# Patient Record
Sex: FEMALE | HISPANIC OR LATINO
[De-identification: names, ages, dates, MRNs, and addresses within clinical notes are randomized per-mention and may not be internally consistent; named-entity substitution may affect disease eponyms.]

---

## 2021-09-29 PROBLEM — Z00.00 ENCOUNTER FOR PREVENTIVE HEALTH EXAMINATION: Status: ACTIVE | Noted: 2021-09-29

## 2021-09-30 ENCOUNTER — APPOINTMENT (OUTPATIENT)
Dept: OBGYN | Facility: CLINIC | Age: 31
End: 2021-09-30
Payer: COMMERCIAL

## 2021-09-30 ENCOUNTER — NON-APPOINTMENT (OUTPATIENT)
Age: 31
End: 2021-09-30

## 2021-09-30 VITALS
SYSTOLIC BLOOD PRESSURE: 123 MMHG | WEIGHT: 138 LBS | DIASTOLIC BLOOD PRESSURE: 70 MMHG | BODY MASS INDEX: 24.45 KG/M2 | HEIGHT: 63 IN

## 2021-09-30 DIAGNOSIS — Z82.49 FAMILY HISTORY OF ISCHEMIC HEART DISEASE AND OTHER DISEASES OF THE CIRCULATORY SYSTEM: ICD-10-CM

## 2021-09-30 DIAGNOSIS — Z01.419 ENCOUNTER FOR GYNECOLOGICAL EXAMINATION (GENERAL) (ROUTINE) W/OUT ABNORMAL FINDINGS: ICD-10-CM

## 2021-09-30 DIAGNOSIS — Z83.3 FAMILY HISTORY OF DIABETES MELLITUS: ICD-10-CM

## 2021-09-30 DIAGNOSIS — R87.629 UNSPECIFIED ABNORMAL CYTOLOGICAL FINDINGS IN SPECIMENS FROM VAGINA: ICD-10-CM

## 2021-09-30 DIAGNOSIS — Z80.41 FAMILY HISTORY OF MALIGNANT NEOPLASM OF OVARY: ICD-10-CM

## 2021-09-30 DIAGNOSIS — Z78.9 OTHER SPECIFIED HEALTH STATUS: ICD-10-CM

## 2021-09-30 DIAGNOSIS — Z98.890 OTHER SPECIFIED POSTPROCEDURAL STATES: ICD-10-CM

## 2021-09-30 DIAGNOSIS — Z13.220 ENCOUNTER FOR SCREENING FOR LIPOID DISORDERS: ICD-10-CM

## 2021-09-30 DIAGNOSIS — Z82.3 FAMILY HISTORY OF STROKE: ICD-10-CM

## 2021-09-30 PROCEDURE — 99204 OFFICE O/P NEW MOD 45 MIN: CPT

## 2021-09-30 PROCEDURE — 36415 COLL VENOUS BLD VENIPUNCTURE: CPT

## 2021-09-30 PROCEDURE — 81003 URINALYSIS AUTO W/O SCOPE: CPT | Mod: QW

## 2021-10-01 PROBLEM — Z01.419 WELL WOMAN EXAM WITH ROUTINE GYNECOLOGICAL EXAM: Status: ACTIVE | Noted: 2021-09-30

## 2021-10-04 ENCOUNTER — NON-APPOINTMENT (OUTPATIENT)
Age: 31
End: 2021-10-04

## 2021-10-04 DIAGNOSIS — N39.0 URINARY TRACT INFECTION, SITE NOT SPECIFIED: ICD-10-CM

## 2021-10-04 DIAGNOSIS — N39.41 URGE INCONTINENCE: ICD-10-CM

## 2021-10-04 PROBLEM — Z98.890 HISTORY OF COLPOSCOPY: Status: RESOLVED | Noted: 2021-10-04 | Resolved: 2021-10-04

## 2021-10-04 LAB
25(OH)D3 SERPL-MCNC: 17.2 NG/ML
BACTERIA UR CULT: ABNORMAL
BASOPHILS # BLD AUTO: 0.03 K/UL
BASOPHILS NFR BLD AUTO: 0.5 %
CHOLEST SERPL-MCNC: 164 MG/DL
CHOLEST SERPL-MCNC: 168 MG/DL
EOSINOPHIL # BLD AUTO: 0.05 K/UL
EOSINOPHIL NFR BLD AUTO: 0.9 %
HCT VFR BLD CALC: 42.8 %
HDLC SERPL-MCNC: 80 MG/DL
HGB BLD-MCNC: 13.4 G/DL
HPV HIGH+LOW RISK DNA PNL CVX: NOT DETECTED
IMM GRANULOCYTES NFR BLD AUTO: 0.7 %
LDLC SERPL CALC-MCNC: 74 MG/DL
LYMPHOCYTES # BLD AUTO: 2.03 K/UL
LYMPHOCYTES NFR BLD AUTO: 35.5 %
MAN DIFF?: NORMAL
MCHC RBC-ENTMCNC: 28.5 PG
MCHC RBC-ENTMCNC: 31.3 GM/DL
MCV RBC AUTO: 91.1 FL
MONOCYTES # BLD AUTO: 0.56 K/UL
MONOCYTES NFR BLD AUTO: 9.8 %
NEUTROPHILS # BLD AUTO: 3.01 K/UL
NEUTROPHILS NFR BLD AUTO: 52.6 %
NONHDLC SERPL-MCNC: 84 MG/DL
PLATELET # BLD AUTO: 305 K/UL
RBC # BLD: 4.7 M/UL
RBC # FLD: 13.4 %
TRIGL SERPL-MCNC: 51 MG/DL
TSH SERPL-ACNC: 2.63 UIU/ML
WBC # FLD AUTO: 5.72 K/UL

## 2021-10-04 NOTE — REVIEW OF SYSTEMS
[Genital Rash/Irritation] : genital rash/irritation [Negative] : Integumentary [FreeTextEntry6] : non labored breathing

## 2021-10-04 NOTE — HISTORY OF PRESENT ILLNESS
[Patient reported PAP Smear was normal] : Patient reported PAP Smear was normal [N] : Patient denies prior pregnancies [Previously active] : previously active [Regular Cycle Intervals] : periods have been regular [No] : No [Patient refuses STI testing] : Patient refuses STI testing [PapSmeardate] : 2020 [LMPDate] : 09/13/2021 [MensesFreq] : 28 [MensesLength] : 5 [PGHxTotal] : 0 [PGHxFullTerm] : 0 [PGHxPremature] : 0 [PGHxAbortions] : 0 [[facility]] : 0 [PGHxABInduced] : 0 [PGHxABSpont] : 0 [PGHxEctopic] : 0 [PGHxMultBirths] : 0 [FreeTextEntry1] : not currently active

## 2021-10-04 NOTE — PLAN
[FreeTextEntry1] : no breast exam performed.\par pelvic exam: external genitalia and labia with noticeable erythema and irritation. Patient stated she has been "scratching". Discussed removing irritants: new detergent, pads, clean/dry clothing post workout/swimming. Reinforced wearing 100% cotton underwear. \par \par She has not had blood work done in a few years: lipid profile, cbc, vit d, TSH\par repeat pap/hpv, vaginitis panel, urine culture sent. \par Clotrimazole-Betamethasone cream called into pharmacy for labial/perineum irritation. \par \par will call back with results. f/u in April for annual

## 2021-10-04 NOTE — COUNSELING
[Bladder Hygiene] : bladder hygiene [Contraception/ Emergency Contraception/ Safe Sexual Practices] : contraception, emergency contraception, safe sexual practices [Other ___] : [unfilled]

## 2021-10-04 NOTE — PHYSICAL EXAM
[Appropriately responsive] : appropriately responsive [Alert] : alert [No Acute Distress] : no acute distress [Clear to Auscultation B/L] : clear to auscultation bilaterally [Soft] : soft [Non-tender] : non-tender [Non-distended] : non-distended [Oriented x3] : oriented x3 [Labia Majora Erythema] : erythema [Normal] : normal [Uterine Adnexae] : normal [FreeTextEntry1] : erythema on perineum  [FreeTextEntry2] : e

## 2021-10-06 LAB
A VAGINAE DNA VAG QL NAA+PROBE: NORMAL
BVAB2 DNA VAG QL NAA+PROBE: NORMAL
C KRUSEI DNA VAG QL NAA+PROBE: NEGATIVE
C KRUSEI DNA VAG QL NAA+PROBE: POSITIVE
C TRACH RRNA SPEC QL NAA+PROBE: NEGATIVE
MEGA1 DNA VAG QL NAA+PROBE: NORMAL
N GONORRHOEA RRNA SPEC QL NAA+PROBE: NEGATIVE
T VAGINALIS RRNA SPEC QL NAA+PROBE: NEGATIVE

## 2021-10-07 LAB — CYTOLOGY CVX/VAG DOC THIN PREP: ABNORMAL

## 2021-11-04 ENCOUNTER — APPOINTMENT (OUTPATIENT)
Dept: HEART AND VASCULAR | Facility: CLINIC | Age: 31
End: 2021-11-04
Payer: COMMERCIAL

## 2021-11-04 ENCOUNTER — TRANSCRIPTION ENCOUNTER (OUTPATIENT)
Age: 31
End: 2021-11-04

## 2021-11-04 VITALS
WEIGHT: 139 LBS | HEIGHT: 63 IN | HEART RATE: 81 BPM | DIASTOLIC BLOOD PRESSURE: 60 MMHG | SYSTOLIC BLOOD PRESSURE: 110 MMHG | BODY MASS INDEX: 24.63 KG/M2 | TEMPERATURE: 98 F | OXYGEN SATURATION: 98 % | RESPIRATION RATE: 14 BRPM

## 2021-11-04 DIAGNOSIS — J32.9 CHRONIC SINUSITIS, UNSPECIFIED: ICD-10-CM

## 2021-11-04 PROCEDURE — 99202 OFFICE O/P NEW SF 15 MIN: CPT

## 2021-11-04 RX ORDER — AMOXICILLIN 500 MG/1
500 CAPSULE ORAL 3 TIMES DAILY
Qty: 3 | Refills: 0 | Status: DISCONTINUED | COMMUNITY
Start: 2021-10-04 | End: 2021-11-04

## 2021-11-04 RX ORDER — FLUTICASONE PROPIONATE 50 UG/1
50 SPRAY, METERED NASAL DAILY
Qty: 1 | Refills: 3 | Status: ACTIVE | COMMUNITY
Start: 2021-11-04 | End: 1900-01-01

## 2021-11-04 RX ORDER — AMOXICILLIN 500 MG/1
500 TABLET, FILM COATED ORAL 3 TIMES DAILY
Qty: 9 | Refills: 0 | Status: DISCONTINUED | COMMUNITY
Start: 2021-10-05 | End: 2021-11-04

## 2021-11-04 NOTE — PHYSICAL EXAM
[Well Nourished] : well nourished [No Acute Distress] : no acute distress [Normal Conjunctiva] : normal conjunctiva [Clear Lung Fields] : clear lung fields [Normal Gait] : normal gait [No Edema] : no edema [No Cyanosis] : no cyanosis [No Clubbing] : no clubbing [Moves all extremities] : moves all extremities [No Focal Deficits] : no focal deficits [Normal Speech] : normal speech [Alert and Oriented] : alert and oriented [Normal memory] : normal memory

## 2021-11-04 NOTE — HISTORY OF PRESENT ILLNESS
[FreeTextEntry1] : recently completed course of antibx for sinusitis, seen in [facility]\par still c/o post nasal drip and sinus discomfort\par clear mucous no fever

## 2021-11-05 ENCOUNTER — APPOINTMENT (OUTPATIENT)
Dept: OTOLARYNGOLOGY | Facility: CLINIC | Age: 31
End: 2021-11-05
Payer: COMMERCIAL

## 2021-11-05 VITALS
HEART RATE: 60 BPM | DIASTOLIC BLOOD PRESSURE: 71 MMHG | HEIGHT: 63 IN | WEIGHT: 135 LBS | OXYGEN SATURATION: 99 % | BODY MASS INDEX: 23.92 KG/M2 | TEMPERATURE: 98.2 F | SYSTOLIC BLOOD PRESSURE: 105 MMHG

## 2021-11-05 DIAGNOSIS — J34.89 OTHER SPECIFIED DISORDERS OF NOSE AND NASAL SINUSES: ICD-10-CM

## 2021-11-05 DIAGNOSIS — J32.9 CHRONIC SINUSITIS, UNSPECIFIED: ICD-10-CM

## 2021-11-05 PROCEDURE — 31231 NASAL ENDOSCOPY DX: CPT

## 2021-11-05 PROCEDURE — 99204 OFFICE O/P NEW MOD 45 MIN: CPT | Mod: 25

## 2021-11-05 RX ORDER — METHYLPREDNISOLONE 4 MG/1
4 TABLET ORAL
Qty: 1 | Refills: 0 | Status: ACTIVE | COMMUNITY
Start: 2021-11-05 | End: 1900-01-01

## 2021-11-05 RX ORDER — DOXYCYCLINE CALCIUM 50 MG/5ML
50 SYRUP ORAL TWICE DAILY
Qty: 200 | Refills: 0 | Status: ACTIVE | COMMUNITY
Start: 2021-11-05 | End: 1900-01-01

## 2021-11-05 NOTE — HISTORY OF PRESENT ILLNESS
[de-identified] : 30 yo female who presents with nasal congestion.  2 weeks ago symptoms started including facial pain and pressure, tenderness, rhinorrhea.  She also had fatigue.  Some decrease in smell and taste.  The symptoms lasted over 1 weeks. she also had some congestion in the ears.  No treatment tried.  Went to the urgent care.  COVID negative  She was dx with sinusitis an treated with amoxicillin and nasal steroid.  She also has been using the netti pot.  She started to have improvement and then over the past 2 days, symptoms have returned.\par \par Of note, hx of nasal surgery including septoplasty and turbinectomy in February 2020 in [place].

## 2021-11-05 NOTE — PROCEDURE
[FreeTextEntry6] : Nasal Endoscopy\par Procedure Note\par   \par Pre-operative Diagnosis: nasal congestion\par Post-operative Diagnosis: same, acute sinusitis\par Anesthesia: Topical\par Procedure: Bilateral nasal endoscopy\par   \par Procedure Details: \par After topical anesthesia and decongestant, the patient was placed in the supine position. The telescope was passed along the left nasal floor to the nasopharynx. It was then passed into the region of the middle meatus, middle turbinate, and the sphenoethmoid region.  An identical procedure was performed on the right side. \par   \par Findings: \par Mucosa: 	                mild edema	\par Nasal septum: 	midline - post surgical\par Discharge: 	none	\par Turbinates: 	mild hypertrophy, but evidence of previous surgery	\par Adenoid: 	                normal	\par Posterior choanae: 	normal	\par Eustachian tubes: 	normal	\par Mucous stranding: 	normal 	\par Lesions: 	                Not present	\par   \par Comments: \par Condition: Stable. Patient tolerated procedure well.\par Complications: None\par \par

## 2021-11-05 NOTE — ASSESSMENT
[FreeTextEntry1] : 30 yo female who presents with nasal congestion, rhinorrhea, and facial pain.  She had been started on amoxicillin and nasal steroids to no avail.  Exam without overt purulence, but given symptoms I am recommending a change to the antibiotics.  Will trial doxycycline as well as a medrol dos lucian in addition to nasal saline rinse and nasal steroids.  Follow up in 1 week.  If symptoms don't improve will plan CT sinus.\par \par Discussed appropriate way to take doxy, including risk fo sun sensitivity, and esophagitis.  Discussed switching abx is ok, and she was given anti-fungal from PCP if concern for opportunistic growth w abx use.  I advised followup with GYN if that is the case.\par \par - doxycycline\par - medrol\par - nasal saline, nasal steroids\par - fu 1 week for reevaluation.

## 2021-11-09 ENCOUNTER — TRANSCRIPTION ENCOUNTER (OUTPATIENT)
Age: 31
End: 2021-11-09

## 2021-11-11 ENCOUNTER — TRANSCRIPTION ENCOUNTER (OUTPATIENT)
Age: 31
End: 2021-11-11

## 2021-11-15 ENCOUNTER — TRANSCRIPTION ENCOUNTER (OUTPATIENT)
Age: 31
End: 2021-11-15

## 2021-11-23 ENCOUNTER — APPOINTMENT (OUTPATIENT)
Dept: OTOLARYNGOLOGY | Facility: CLINIC | Age: 31
End: 2021-11-23
Payer: COMMERCIAL

## 2021-11-23 DIAGNOSIS — R51.9 HEADACHE, UNSPECIFIED: ICD-10-CM

## 2021-11-23 DIAGNOSIS — R09.81 NASAL CONGESTION: ICD-10-CM

## 2021-11-23 PROCEDURE — 99212 OFFICE O/P EST SF 10 MIN: CPT | Mod: 95

## 2021-11-30 PROBLEM — R09.81 NASAL CONGESTION: Status: ACTIVE | Noted: 2021-11-05

## 2021-11-30 PROBLEM — R51.9 FACIAL PAIN: Status: ACTIVE | Noted: 2021-11-05

## 2021-11-30 NOTE — HISTORY OF PRESENT ILLNESS
[Home] : at home, [unfilled] , at the time of the visit. [Medical Office: ([facility])___] : at the medical office located in  [Verbal consent obtained from patient] : the patient, [unfilled] [de-identified] : 32 yo female who presents with nasal congestion.  2 weeks ago symptoms started including facial pain and pressure, tenderness, rhinorrhea.  She also had fatigue.  Some decrease in smell and taste.  The symptoms lasted over 1 weeks. she also had some congestion in the ears.  No treatment tried.  Went to the urgent care.  COVID negative  She was dx with sinusitis an treated with amoxicillin and nasal steroid.  She also has been using the netti pot.  She started to have improvement and then over the past 2 days, symptoms have returned.\par \par Of note, hx of nasal surgery including septoplasty and turbinectomy in February 2020 in [place].\par - [FreeTextEntry1] : Update 11/23/21\par Overall stable and well.  She was able to take the doxycycline without issue.  She has not been as regimented about nasal saline use.  Overall she does note improvement in symptoms and facial pain is resolved.  She does have some mild congestion.  No ENT issues otherwise.

## 2021-11-30 NOTE — ASSESSMENT
[FreeTextEntry1] : 30 yo female who presents with nasal congestion, rhinorrhea, and facial pain.   Symptoms now resolved after course of abx.  She does have some remaining nasal congestion, but hasn't been as regimented with nasal saline.  At this point I am recommending nasal saline and follow up in 3-4 weeks.  If symptoms still persist, will need re-eval in the office and possibly CT sinus.\par \par - nasal saline, nasal steroids\par - fu 3-4 weeks  \par - if symptoms persist, consider CT sinus

## 2021-12-03 ENCOUNTER — APPOINTMENT (OUTPATIENT)
Dept: OTOLARYNGOLOGY | Facility: CLINIC | Age: 31
End: 2021-12-03

## 2022-01-20 ENCOUNTER — APPOINTMENT (OUTPATIENT)
Dept: OBGYN | Facility: CLINIC | Age: 32
End: 2022-01-20

## 2022-01-20 ENCOUNTER — APPOINTMENT (OUTPATIENT)
Dept: OBGYN | Facility: CLINIC | Age: 32
End: 2022-01-20
Payer: COMMERCIAL

## 2022-01-20 VITALS
DIASTOLIC BLOOD PRESSURE: 75 MMHG | BODY MASS INDEX: 26.22 KG/M2 | HEIGHT: 63 IN | SYSTOLIC BLOOD PRESSURE: 109 MMHG | HEART RATE: 97 BPM | WEIGHT: 148 LBS

## 2022-01-20 DIAGNOSIS — F41.9 ANXIETY DISORDER, UNSPECIFIED: ICD-10-CM

## 2022-01-20 DIAGNOSIS — B37.3 CANDIDIASIS OF VULVA AND VAGINA: ICD-10-CM

## 2022-01-20 PROCEDURE — 99214 OFFICE O/P EST MOD 30 MIN: CPT

## 2022-01-20 RX ORDER — DOXYCYCLINE HYCLATE 100 MG/1
100 CAPSULE ORAL TWICE DAILY
Qty: 20 | Refills: 0 | Status: COMPLETED | COMMUNITY
Start: 2021-11-10 | End: 2022-01-20

## 2022-01-20 RX ORDER — CLOTRIMAZOLE AND BETAMETHASONE DIPROPIONATE 10; .5 MG/G; MG/G
1-0.05 CREAM TOPICAL
Qty: 1 | Refills: 1 | Status: COMPLETED | COMMUNITY
Start: 2021-09-30 | End: 2022-01-20

## 2022-01-20 RX ORDER — DOXYCYCLINE HYCLATE 100 MG/1
100 TABLET ORAL
Qty: 20 | Refills: 0 | Status: COMPLETED | COMMUNITY
Start: 2021-11-11 | End: 2022-01-20

## 2022-01-20 RX ORDER — CITALOPRAM 10 MG/1
TABLET, FILM COATED ORAL
Refills: 0 | Status: COMPLETED | COMMUNITY
End: 2022-01-20

## 2022-01-24 NOTE — PHYSICAL EXAM
[Chaperone Present] : A chaperone was present in the examining room during all aspects of the physical examination [Appropriately responsive] : appropriately responsive [Alert] : alert [No Acute Distress] : no acute distress [Soft] : soft [Non-tender] : non-tender [Oriented x3] : oriented x3 [Labia Majora] : normal [Labia Minora] : normal [Discharge] : a  ~M vaginal discharge was present [Moderate] : moderate [White] : white [Thick] : thick [Normal] : normal [Anteversion] : anteverted [Uterine Adnexae] : normal

## 2022-01-24 NOTE — HISTORY OF PRESENT ILLNESS
[FreeTextEntry1] : 30 yo presents due to vaginal discharge likely candida, she gets frequent candida infections. She took antibiotics in November and December and has been with repeated candida infections ever since. She uses the "pill" twice. She has irritate, discharge and itching, no dysuria. She is also requesting a refill of her anxiety medication has not found a pcp here in [place] [facility] yet  [LMPDate] : 01/03/2022 [MensesFreq] : 28 [MensesLength] : 5

## 2022-01-24 NOTE — DISCUSSION/SUMMARY
[FreeTextEntry1] : 30 yo presents for initial visit with me to discuss recurrent vaginitis\par -culture done\par -Rx sent for presumed candida\par -education provided, we discussed boric acid, cotton underwear, not using liners, diet\par -Rx given for anxiety medication and referral to internal medicine\par -f/u wellness visit/ PRN\par

## 2022-01-25 ENCOUNTER — NON-APPOINTMENT (OUTPATIENT)
Age: 32
End: 2022-01-25

## 2022-01-25 ENCOUNTER — TRANSCRIPTION ENCOUNTER (OUTPATIENT)
Age: 32
End: 2022-01-25

## 2022-03-21 ENCOUNTER — TRANSCRIPTION ENCOUNTER (OUTPATIENT)
Age: 32
End: 2022-03-21

## 2022-04-01 ENCOUNTER — TRANSCRIPTION ENCOUNTER (OUTPATIENT)
Age: 32
End: 2022-04-01

## 2022-04-14 ENCOUNTER — TRANSCRIPTION ENCOUNTER (OUTPATIENT)
Age: 32
End: 2022-04-14

## 2022-05-09 ENCOUNTER — APPOINTMENT (OUTPATIENT)
Dept: OBGYN | Facility: CLINIC | Age: 32
End: 2022-05-09

## 2022-05-09 ENCOUNTER — APPOINTMENT (OUTPATIENT)
Dept: OBGYN | Facility: CLINIC | Age: 32
End: 2022-05-09
Payer: COMMERCIAL

## 2022-05-09 VITALS
BODY MASS INDEX: 27.82 KG/M2 | SYSTOLIC BLOOD PRESSURE: 108 MMHG | HEART RATE: 98 BPM | HEIGHT: 63 IN | DIASTOLIC BLOOD PRESSURE: 70 MMHG | WEIGHT: 157 LBS

## 2022-05-09 DIAGNOSIS — B37.3 CANDIDIASIS OF VULVA AND VAGINA: ICD-10-CM

## 2022-05-09 DIAGNOSIS — R30.0 DYSURIA: ICD-10-CM

## 2022-05-09 PROCEDURE — 99213 OFFICE O/P EST LOW 20 MIN: CPT

## 2022-05-09 RX ORDER — FLUCONAZOLE 150 MG/1
150 TABLET ORAL
Qty: 10 | Refills: 3 | Status: ACTIVE | COMMUNITY
Start: 2022-05-09 | End: 1900-01-01

## 2022-05-10 NOTE — PHYSICAL EXAM
[Chaperone Present] : A chaperone was present in the examining room during all aspects of the physical examination [Appropriately responsive] : appropriately responsive [Alert] : alert [No Acute Distress] : no acute distress [Soft] : soft [Non-tender] : non-tender [Non-distended] : non-distended [No Mass] : no mass [Oriented x3] : oriented x3 [Labia Majora] : normal [Labia Minora] : normal [Discharge] : a  ~M vaginal discharge was present [Moderate] : moderate [White] : white [Thick] : thick [Normal] : normal [Uterine Adnexae] : normal [FreeTextEntry5] : no cmt

## 2022-05-10 NOTE — PLAN
[FreeTextEntry1] : 1. mild dysuria: rule out UTI\par \par 2. recurrent vaginitis: swab sent to retest, but since pt has had 2 dx'ed candida albicans already this yr reviewed with her uptodate option to do prolonged weekly diflucan. this ordered.\par d/w pt alternative options like boric acid and need to rule out BV

## 2022-05-10 NOTE — HISTORY OF PRESENT ILLNESS
[N] : Patient denies prior pregnancies [FreeTextEntry1] : pt has had 2 candida albicans diagonsed vaginal infections this year, feels like may have recurrent yeast but has also had BV in past and wants to make sure not this [LMPDate] : 04/20/2022 [MensesFreq] : 26.5 [MensesLength] : 6 [PGHxTotal] : 0 [PGHxFullTerm] : 0 [PGHxPremature] : 0 [PGHxAbortions] : 0 [[facility] [PGHxABInduced] : 0 [PGHxABSpont] : 0 [PGHxEctopic] : 0 [PGHxMultBirths] : 0

## 2022-05-11 LAB — BACTERIA UR CULT: NORMAL

## 2022-05-12 ENCOUNTER — TRANSCRIPTION ENCOUNTER (OUTPATIENT)
Age: 32
End: 2022-05-12

## 2022-07-19 ENCOUNTER — TRANSCRIPTION ENCOUNTER (OUTPATIENT)
Age: 32
End: 2022-07-19

## 2022-07-22 ENCOUNTER — APPOINTMENT (OUTPATIENT)
Dept: OBGYN | Facility: CLINIC | Age: 32
End: 2022-07-22

## 2022-07-22 VITALS
SYSTOLIC BLOOD PRESSURE: 121 MMHG | WEIGHT: 156 LBS | HEIGHT: 63 IN | BODY MASS INDEX: 27.64 KG/M2 | HEART RATE: 71 BPM | DIASTOLIC BLOOD PRESSURE: 82 MMHG

## 2022-07-22 DIAGNOSIS — B37.9 CANDIDIASIS, UNSPECIFIED: ICD-10-CM

## 2022-07-22 PROCEDURE — 99213 OFFICE O/P EST LOW 20 MIN: CPT

## 2022-07-22 RX ORDER — IBREXAFUNGERP 150 MG/1
150 TABLET, FILM COATED ORAL
Qty: 1 | Refills: 0 | Status: DISCONTINUED | COMMUNITY
Start: 2022-07-22 | End: 2022-07-22

## 2022-07-22 RX ORDER — FLUCONAZOLE 150 MG/1
150 TABLET ORAL
Qty: 3 | Refills: 1 | Status: ACTIVE | COMMUNITY
Start: 2022-07-22 | End: 1900-01-01

## 2022-07-22 RX ORDER — NYSTATIN AND TRIAMCINOLONE ACETONIDE 100000; 1 MG/G; MG/G
100000-0.1 CREAM TOPICAL
Qty: 30 | Refills: 2 | Status: ACTIVE | COMMUNITY
Start: 2022-07-22 | End: 1900-01-01

## 2022-08-09 NOTE — HISTORY OF PRESENT ILLNESS
[N] : Patient denies prior pregnancies [FreeTextEntry1] : "Pt states she does not have a lot of symptoms, but she has gotten them enough to think its yeast."\par \par Pt presents with vaginal discharge and itching, concerned about yeast infection.  [LMPDate] : 07/14/2022 [MensesFreq] : 27 [MensesLength] : 5 [PGHxTotal] : 0 [PGHxFullTerm] : 0 [PGHxPremature] : 0 [PGHxAbortions] : 0 [[facility]] : [facility] [PGHxABInduced] : 0 [PGHxABSpont] : 0 [PGHxEctopic] : 0 [PGHxMultBirths] : 0

## 2022-08-09 NOTE — PLAN
[FreeTextEntry1] : Vaginal cultures taken. \par \par Will treat with Fluconazole and external mycolog cream.\par \par Referral to [facility] also for egg freezing consultation.  \par \par Follow up as needed.  - c/w home atorvastatin

## 2022-08-09 NOTE — PHYSICAL EXAM
[Chaperone Present] : A chaperone was present in the examining room during all aspects of the physical examination [Appropriately responsive] : appropriately responsive [Alert] : alert [No Acute Distress] : no acute distress [Oriented x3] : oriented x3 [Labia Majora] : normal [Labia Minora] : normal [No Bleeding] : There was no active vaginal bleeding [Normal] : normal [FreeTextEntry4] : moderate thick discharge consistent with yeast infection [FreeTextEntry6] : deferred [FreeTextEntry9] : deferred [FreeTextEntry8] : deferred

## 2022-09-28 RX ORDER — TERCONAZOLE 80 MG/1
80 SUPPOSITORY VAGINAL
Qty: 3 | Refills: 2 | Status: DISCONTINUED | COMMUNITY
Start: 2022-05-11 | End: 2022-09-28

## 2022-09-28 RX ORDER — TERCONAZOLE 8 MG/G
0.8 CREAM VAGINAL
Qty: 1 | Refills: 0 | Status: DISCONTINUED | COMMUNITY
Start: 2022-01-20 | End: 2022-09-28

## 2022-10-11 ENCOUNTER — APPOINTMENT (OUTPATIENT)
Dept: OBGYN | Facility: CLINIC | Age: 32
End: 2022-10-11

## 2022-10-11 VITALS
BODY MASS INDEX: 27.46 KG/M2 | DIASTOLIC BLOOD PRESSURE: 84 MMHG | HEART RATE: 80 BPM | WEIGHT: 155 LBS | HEIGHT: 63 IN | SYSTOLIC BLOOD PRESSURE: 130 MMHG

## 2022-10-11 DIAGNOSIS — N76.0 ACUTE VAGINITIS: ICD-10-CM

## 2022-10-11 DIAGNOSIS — R35.0 FREQUENCY OF MICTURITION: ICD-10-CM

## 2022-10-11 PROCEDURE — 99213 OFFICE O/P EST LOW 20 MIN: CPT

## 2022-10-11 NOTE — DISCUSSION/SUMMARY
[FreeTextEntry1] : 32 yo with vaginal odor and increased urinary frequency\par -reviewed vaginal/vulvar hygiene preventative measure, education provided, literature given\par -treat presumed bacterial vaginosis\par -f/u culture

## 2022-10-11 NOTE — HISTORY OF PRESENT ILLNESS
[N] : Patient denies prior pregnancies [FreeTextEntry1] : Some odor and itching, urinary frequency since treatment with fluconazole  [TextBox_4] : Pt staes she had urgency to urinate, and itchiness and thinks she has a UTI  [LMPDate] : 09/11/2022 [MensesFreq] : 28 [MensesLength] : 4-5 [PGHxTotal] : 0 [PGHxFullTerm] : 0 [PGHxPremature] : 0 [PGHxAbortions] : 0 [[facility]] : 0 [PGHxABInduced] : 0 [PGHxABSpont] : 0 [PGHxEctopic] : 0 [PGHxMultBirths] : 0

## 2022-10-11 NOTE — PHYSICAL EXAM
[Appropriately responsive] : appropriately responsive [Alert] : alert [No Acute Distress] : no acute distress [Soft] : soft [Non-tender] : non-tender [Oriented x3] : oriented x3 [FreeTextEntry7] : no CVA [Labia Majora] : normal [Labia Minora] : normal [Discharge] : a  ~M vaginal discharge was present [Scant] : scant [White] : white [Thin] : thin [Normal] : normal [Uterine Adnexae] : normal

## 2022-10-13 ENCOUNTER — NON-APPOINTMENT (OUTPATIENT)
Age: 32
End: 2022-10-13

## 2022-10-13 LAB
CANDIDA VAG CYTO: NOT DETECTED
G VAGINALIS+PREV SP MTYP VAG QL MICRO: NOT DETECTED
T VAGINALIS VAG QL WET PREP: NOT DETECTED

## 2022-11-01 ENCOUNTER — APPOINTMENT (OUTPATIENT)
Dept: OBGYN | Facility: CLINIC | Age: 32
End: 2022-11-01

## 2022-11-01 VITALS
WEIGHT: 154 LBS | DIASTOLIC BLOOD PRESSURE: 77 MMHG | OXYGEN SATURATION: 97 % | HEART RATE: 82 BPM | BODY MASS INDEX: 27.28 KG/M2 | SYSTOLIC BLOOD PRESSURE: 113 MMHG

## 2022-11-01 DIAGNOSIS — N76.2 ACUTE VULVITIS: ICD-10-CM

## 2022-11-01 PROCEDURE — 99213 OFFICE O/P EST LOW 20 MIN: CPT

## 2022-11-01 RX ORDER — FLUCONAZOLE 150 MG/1
150 TABLET ORAL
Qty: 1 | Refills: 1 | Status: ACTIVE | COMMUNITY
Start: 2021-10-04 | End: 1900-01-01

## 2022-11-01 RX ORDER — TINIDAZOLE 500 MG/1
500 TABLET, FILM COATED ORAL
Qty: 8 | Refills: 1 | Status: ACTIVE | COMMUNITY
Start: 2022-10-11 | End: 1900-01-01

## 2022-11-01 NOTE — PHYSICAL EXAM
[Appropriately responsive] : appropriately responsive [Alert] : alert [No Acute Distress] : no acute distress [Oriented x3] : oriented x3 [Examination Of The Breasts] : a normal appearance [Vulvitis] : vulvitis [Normal] : normal

## 2022-11-01 NOTE — HISTORY OF PRESENT ILLNESS
[Normal Amount/Duration] :  normal amount and duration [Menstrual Cramps] : menstrual cramps [Previously active] : previously active [FreeTextEntry1] : 10/14/2022

## 2022-11-01 NOTE — DISCUSSION/SUMMARY
[FreeTextEntry1] : Preventative therapy- Long term therapy for Yeast, prescribe a tablet  1 x week for 4 months or if it is Bacterial vaginosis recommend a gel 2 x week for 4 months.\par -Monitor signs and symptoms  if infection could be associated with menses.\par -Period panty instead of tampons or sanitary pads. \par -Salt baths\par \par \par All questions and concerns addressed, patient expressed understanding. Encouraged to contact the office with any questions or concerns.\par

## 2022-11-02 ENCOUNTER — TRANSCRIPTION ENCOUNTER (OUTPATIENT)
Age: 32
End: 2022-11-02

## 2022-11-02 LAB
C TRACH RRNA SPEC QL NAA+PROBE: NOT DETECTED
CANDIDA VAG CYTO: DETECTED
G VAGINALIS+PREV SP MTYP VAG QL MICRO: NOT DETECTED
N GONORRHOEA RRNA SPEC QL NAA+PROBE: NOT DETECTED
SOURCE AMPLIFICATION: NORMAL
T VAGINALIS VAG QL WET PREP: NOT DETECTED

## 2022-11-03 ENCOUNTER — TRANSCRIPTION ENCOUNTER (OUTPATIENT)
Age: 32
End: 2022-11-03

## 2022-11-04 ENCOUNTER — TRANSCRIPTION ENCOUNTER (OUTPATIENT)
Age: 32
End: 2022-11-04

## 2022-12-16 ENCOUNTER — TRANSCRIPTION ENCOUNTER (OUTPATIENT)
Age: 32
End: 2022-12-16

## 2022-12-20 ENCOUNTER — APPOINTMENT (OUTPATIENT)
Dept: OBGYN | Facility: CLINIC | Age: 32
End: 2022-12-20

## 2022-12-20 VITALS
DIASTOLIC BLOOD PRESSURE: 80 MMHG | WEIGHT: 155 LBS | OXYGEN SATURATION: 99 % | BODY MASS INDEX: 27.46 KG/M2 | HEIGHT: 63 IN | HEART RATE: 64 BPM | SYSTOLIC BLOOD PRESSURE: 131 MMHG

## 2022-12-20 DIAGNOSIS — Z31.41 ENCOUNTER FOR FERTILITY TESTING: ICD-10-CM

## 2022-12-20 PROCEDURE — 81002 URINALYSIS NONAUTO W/O SCOPE: CPT

## 2022-12-20 PROCEDURE — 99214 OFFICE O/P EST MOD 30 MIN: CPT

## 2022-12-20 RX ORDER — FLUCONAZOLE 200 MG/1
200 TABLET ORAL
Qty: 2 | Refills: 1 | Status: ACTIVE | COMMUNITY
Start: 2022-12-20 | End: 1900-01-01

## 2022-12-21 ENCOUNTER — TRANSCRIPTION ENCOUNTER (OUTPATIENT)
Age: 32
End: 2022-12-21

## 2022-12-21 LAB
BILIRUB UR QL STRIP: NEGATIVE
CANDIDA VAG CYTO: NOT DETECTED
CLARITY UR: CLEAR
COLLECTION METHOD: NORMAL
ESTRADIOL SERPL-MCNC: 50 PG/ML
FSH SERPL-MCNC: 7.9 IU/L
G VAGINALIS+PREV SP MTYP VAG QL MICRO: NOT DETECTED
GLUCOSE UR-MCNC: NEGATIVE
HCG UR QL: 0.2 EU/DL
HGB UR QL STRIP.AUTO: NEGATIVE
KETONES UR-MCNC: NEGATIVE
LEUKOCYTE ESTERASE UR QL STRIP: NEGATIVE
NITRITE UR QL STRIP: NEGATIVE
PH UR STRIP: 7
PROLACTIN SERPL-MCNC: 14.8 NG/ML
PROT UR STRIP-MCNC: NEGATIVE
SP GR UR STRIP: 1.01
T VAGINALIS VAG QL WET PREP: NOT DETECTED
TSH SERPL-ACNC: 2.32 UIU/ML

## 2022-12-21 NOTE — DISCUSSION/SUMMARY
[FreeTextEntry1] : 31 yo with vaginal irritation\par -she did not start Diflucan as planned 10 days before menses to prevent infection\par -f/u culture\par -management plan reviewed she will take Diflucan monthly\par -we discussed egg freezing, referral given, she is on 3 day of menses check hormones\par -f/u PRN \par

## 2022-12-21 NOTE — PHYSICAL EXAM
[Chaperone Present] : A chaperone was present in the examining room during all aspects of the physical examination [Labia Majora] : normal [Labia Minora] : normal [Discharge] : a  ~M vaginal discharge was present [Normal] : normal [FreeTextEntry1] : Mackenzie Kaur [Appropriately responsive] : appropriately responsive [Alert] : alert [No Acute Distress] : no acute distress [Soft] : soft [Non-tender] : non-tender [Erythematous] : erythema [Tenderness] : tenderness [Scant] : scant [White] : white

## 2022-12-21 NOTE — HISTORY OF PRESENT ILLNESS
[Previously active] : previously active [No] : No [FreeTextEntry1] : 89 yo patient presents for vaginal infection. It has been going on for 1 week. Patient is not sure if its UTI or yeast infection. Patient was not feeling well on Friday and had a Telehealth with a PCP who prescribed her Bactrim DS for sinus infection and told patient it would help with UTI if she had one. She is feeling itchy, odor, and urgency to urinate. She also feels a "warm feeling" in her pelvic area. PCP also prescribed  fluconazole but she did not start it yet.

## 2022-12-23 ENCOUNTER — TRANSCRIPTION ENCOUNTER (OUTPATIENT)
Age: 32
End: 2022-12-23

## 2022-12-29 ENCOUNTER — NON-APPOINTMENT (OUTPATIENT)
Age: 32
End: 2022-12-29

## 2022-12-29 LAB — ANTI-MUELLERIAN HORMONE: 2.01 NG/ML

## 2023-02-16 ENCOUNTER — APPOINTMENT (OUTPATIENT)
Dept: OBGYN | Facility: CLINIC | Age: 33
End: 2023-02-16
Payer: COMMERCIAL

## 2023-02-16 VITALS
WEIGHT: 163 LBS | SYSTOLIC BLOOD PRESSURE: 117 MMHG | HEART RATE: 69 BPM | HEIGHT: 63 IN | OXYGEN SATURATION: 98 % | DIASTOLIC BLOOD PRESSURE: 83 MMHG | BODY MASS INDEX: 28.88 KG/M2

## 2023-02-16 DIAGNOSIS — N76.0 ACUTE VAGINITIS: ICD-10-CM

## 2023-02-16 LAB
BILIRUB UR QL STRIP: NORMAL
CLARITY UR: CLEAR
COLLECTION METHOD: NORMAL
GLUCOSE UR-MCNC: NEGATIVE
HCG UR QL: 0.2 EU/DL
HGB UR QL STRIP.AUTO: NEGATIVE
KETONES UR-MCNC: NEGATIVE
LEUKOCYTE ESTERASE UR QL STRIP: NEGATIVE
NITRITE UR QL STRIP: NEGATIVE
PH UR STRIP: 7.5
PROT UR STRIP-MCNC: NEGATIVE
SP GR UR STRIP: 1.01

## 2023-02-16 PROCEDURE — 81002 URINALYSIS NONAUTO W/O SCOPE: CPT

## 2023-02-16 PROCEDURE — 99213 OFFICE O/P EST LOW 20 MIN: CPT

## 2023-02-16 NOTE — HISTORY OF PRESENT ILLNESS
[FreeTextEntry1] : 33 yo present for vaginitis. She  is experience vaginal odor, vaginal itching, and  urine urgency  for 1 week. She states if she holds her urine in too long she would "tinkle" on herself. 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 She will start the process of egg freezing soon, hoping for March.  [LMPDate] : 02/11/23

## 2023-02-16 NOTE — PHYSICAL EXAM
[Chaperone Present] : A chaperone was present in the examining room during all aspects of the physical examination [Appropriately responsive] : appropriately responsive [Alert] : alert [Oriented x3] : oriented x3 [Labia Majora] : normal [Labia Minora] : normal [Discharge] : a  ~M vaginal discharge was present [Scant] : scant [Hilda] : yellow [Thick] : thick [Normal] : normal

## 2023-02-16 NOTE — DISCUSSION/SUMMARY
[FreeTextEntry1] : 31 yo patient presents for vaginitis/urinary frequency\par -f/u culture\par -Start boric acid, literature given \par -discussed preventative measures\par -she will pursue egg freezing\par -f/u PRN

## 2023-02-20 ENCOUNTER — TRANSCRIPTION ENCOUNTER (OUTPATIENT)
Age: 33
End: 2023-02-20

## 2023-02-20 LAB
BACTERIA UR CULT: NORMAL
BASOPHILS # BLD AUTO: 0.03 K/UL
BASOPHILS NFR BLD AUTO: 0.6 %
CANDIDA VAG CYTO: NOT DETECTED
EOSINOPHIL # BLD AUTO: 0.07 K/UL
EOSINOPHIL NFR BLD AUTO: 1.3 %
ESTIMATED AVERAGE GLUCOSE: 105 MG/DL
G VAGINALIS+PREV SP MTYP VAG QL MICRO: NOT DETECTED
HBA1C MFR BLD HPLC: 5.3 %
HCT VFR BLD CALC: 43.7 %
HGB BLD-MCNC: 13.5 G/DL
IMM GRANULOCYTES NFR BLD AUTO: 0.7 %
LYMPHOCYTES # BLD AUTO: 1.66 K/UL
LYMPHOCYTES NFR BLD AUTO: 30.6 %
MAN DIFF?: NORMAL
MCHC RBC-ENTMCNC: 28.1 PG
MCHC RBC-ENTMCNC: 30.9 GM/DL
MCV RBC AUTO: 91 FL
MONOCYTES # BLD AUTO: 0.53 K/UL
MONOCYTES NFR BLD AUTO: 9.8 %
NEUTROPHILS # BLD AUTO: 3.1 K/UL
NEUTROPHILS NFR BLD AUTO: 57 %
PLATELET # BLD AUTO: 324 K/UL
RBC # BLD: 4.8 M/UL
RBC # FLD: 13.6 %
T VAGINALIS VAG QL WET PREP: NOT DETECTED
WBC # FLD AUTO: 5.43 K/UL

## 2023-02-24 RX ORDER — CITALOPRAM HYDROBROMIDE 20 MG/1
20 TABLET, FILM COATED ORAL DAILY
Qty: 30 | Refills: 0 | Status: ACTIVE | COMMUNITY
Start: 2022-01-20 | End: 1900-01-01

## 2023-03-07 ENCOUNTER — TRANSCRIPTION ENCOUNTER (OUTPATIENT)
Age: 33
End: 2023-03-07

## 2023-03-07 DIAGNOSIS — M62.89 OTHER SPECIFIED DISORDERS OF MUSCLE: ICD-10-CM

## 2023-03-08 ENCOUNTER — TRANSCRIPTION ENCOUNTER (OUTPATIENT)
Age: 33
End: 2023-03-08

## 2023-03-23 ENCOUNTER — APPOINTMENT (OUTPATIENT)
Dept: HEART AND VASCULAR | Facility: CLINIC | Age: 33
End: 2023-03-23

## 2023-04-13 ENCOUNTER — APPOINTMENT (OUTPATIENT)
Dept: OBGYN | Facility: CLINIC | Age: 33
End: 2023-04-13

## 2023-12-21 ENCOUNTER — NEW PATIENT (OUTPATIENT)
Dept: URBAN - METROPOLITAN AREA CLINIC 73 | Facility: CLINIC | Age: 33
End: 2023-12-21

## 2023-12-21 DIAGNOSIS — H35.30: ICD-10-CM

## 2023-12-21 DIAGNOSIS — H43.393: ICD-10-CM

## 2023-12-21 DIAGNOSIS — H35.463: ICD-10-CM

## 2023-12-21 DIAGNOSIS — H47.092: ICD-10-CM

## 2023-12-21 PROCEDURE — 92250 FUNDUS PHOTOGRAPHY W/I&R: CPT

## 2023-12-21 PROCEDURE — 99204 OFFICE O/P NEW MOD 45 MIN: CPT

## 2023-12-21 PROCEDURE — 92134 CPTRZ OPH DX IMG PST SGM RTA: CPT | Mod: NC

## 2023-12-21 PROCEDURE — 92201 OPSCPY EXTND RTA DRAW UNI/BI: CPT | Mod: NC

## 2023-12-21 ASSESSMENT — TONOMETRY
OS_IOP_MMHG: 14
OD_IOP_MMHG: 12

## 2023-12-21 ASSESSMENT — VISUAL ACUITY
OS_CC: 20/20
OD_CC: 20/25+3